# Patient Record
Sex: MALE | Race: WHITE
[De-identification: names, ages, dates, MRNs, and addresses within clinical notes are randomized per-mention and may not be internally consistent; named-entity substitution may affect disease eponyms.]

---

## 2020-06-08 ENCOUNTER — HOSPITAL ENCOUNTER (EMERGENCY)
Dept: HOSPITAL 95 - ER | Age: 30
Discharge: HOME | End: 2020-06-08
Payer: COMMERCIAL

## 2020-06-08 VITALS — BODY MASS INDEX: 38.36 KG/M2 | WEIGHT: 315 LBS | HEIGHT: 76 IN

## 2020-06-08 DIAGNOSIS — F10.239: Primary | ICD-10-CM

## 2020-06-08 DIAGNOSIS — F41.0: ICD-10-CM

## 2020-06-08 DIAGNOSIS — F17.210: ICD-10-CM

## 2020-06-08 LAB
ALBUMIN SERPL BCP-MCNC: 3.6 G/DL (ref 3.4–5)
ALBUMIN/GLOB SERPL: 0.9 {RATIO} (ref 0.8–1.8)
ALT SERPL W P-5'-P-CCNC: 61 U/L (ref 12–78)
ANION GAP SERPL CALCULATED.4IONS-SCNC: 4 MMOL/L (ref 6–16)
AST SERPL W P-5'-P-CCNC: 43 U/L (ref 12–37)
BASOPHILS # BLD AUTO: 0.02 K/MM3 (ref 0–0.23)
BASOPHILS NFR BLD AUTO: 0 % (ref 0–2)
BILIRUB SERPL-MCNC: 0.3 MG/DL (ref 0.1–1)
BUN SERPL-MCNC: 11 MG/DL (ref 8–24)
CALCIUM SERPL-MCNC: 8.6 MG/DL (ref 8.5–10.1)
CHLORIDE SERPL-SCNC: 110 MMOL/L (ref 98–108)
CO2 SERPL-SCNC: 28 MMOL/L (ref 21–32)
CREAT SERPL-MCNC: 0.84 MG/DL (ref 0.6–1.2)
DEPRECATED RDW RBC AUTO: 44.5 FL (ref 35.1–46.3)
EOSINOPHIL # BLD AUTO: 0.03 K/MM3 (ref 0–0.68)
EOSINOPHIL NFR BLD AUTO: 0 % (ref 0–6)
ERYTHROCYTE [DISTWIDTH] IN BLOOD BY AUTOMATED COUNT: 14 % (ref 11.7–14.2)
GLOBULIN SER CALC-MCNC: 3.8 G/DL (ref 2.2–4)
GLUCOSE SERPL-MCNC: 110 MG/DL (ref 70–99)
HCT VFR BLD AUTO: 43.6 % (ref 37–53)
HGB BLD-MCNC: 14.4 G/DL (ref 13.5–17.5)
IMM GRANULOCYTES # BLD AUTO: 0.05 K/MM3 (ref 0–0.1)
IMM GRANULOCYTES NFR BLD AUTO: 1 % (ref 0–1)
LYMPHOCYTES # BLD AUTO: 1.79 K/MM3 (ref 0.84–5.2)
LYMPHOCYTES NFR BLD AUTO: 21 % (ref 21–46)
MAGNESIUM SERPL-MCNC: 2.1 MG/DL (ref 1.6–2.4)
MCHC RBC AUTO-ENTMCNC: 33 G/DL (ref 31.5–36.5)
MCV RBC AUTO: 87 FL (ref 80–100)
MONOCYTES # BLD AUTO: 0.75 K/MM3 (ref 0.16–1.47)
MONOCYTES NFR BLD AUTO: 9 % (ref 4–13)
NEUTROPHILS # BLD AUTO: 5.8 K/MM3 (ref 1.96–9.15)
NEUTROPHILS NFR BLD AUTO: 69 % (ref 41–73)
NRBC # BLD AUTO: 0 K/MM3 (ref 0–0.02)
NRBC BLD AUTO-RTO: 0 /100 WBC (ref 0–0.2)
PLATELET # BLD AUTO: 299 K/MM3 (ref 150–400)
POTASSIUM SERPL-SCNC: 4.1 MMOL/L (ref 3.5–5.5)
PROT SERPL-MCNC: 7.4 G/DL (ref 6.4–8.2)
SODIUM SERPL-SCNC: 142 MMOL/L (ref 136–145)

## 2020-07-12 ENCOUNTER — HOSPITAL ENCOUNTER (OUTPATIENT)
Dept: HOSPITAL 95 - ER | Age: 30
Setting detail: OBSERVATION
LOS: 1 days | Discharge: HOME | End: 2020-07-13
Attending: HOSPITALIST | Admitting: EMERGENCY MEDICINE
Payer: COMMERCIAL

## 2020-07-12 VITALS — BODY MASS INDEX: 38.36 KG/M2 | HEIGHT: 75.98 IN | WEIGHT: 315 LBS
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
## 2020-07-12 DIAGNOSIS — F10.229: Primary | ICD-10-CM

## 2020-07-12 DIAGNOSIS — F17.210: ICD-10-CM

## 2020-07-12 DIAGNOSIS — G93.40: ICD-10-CM

## 2020-07-12 DIAGNOSIS — E66.9: ICD-10-CM

## 2020-07-12 DIAGNOSIS — I10: ICD-10-CM

## 2020-07-12 DIAGNOSIS — F41.9: ICD-10-CM

## 2020-07-12 DIAGNOSIS — K21.9: ICD-10-CM

## 2020-07-12 DIAGNOSIS — J96.01: ICD-10-CM

## 2020-07-12 DIAGNOSIS — Z79.899: ICD-10-CM

## 2020-07-12 LAB
ALBUMIN SERPL BCP-MCNC: 3.9 G/DL (ref 3.4–5)
ALBUMIN/GLOB SERPL: 1 {RATIO} (ref 0.8–1.8)
ALT SERPL W P-5'-P-CCNC: 65 U/L (ref 12–78)
ANION GAP SERPL CALCULATED.4IONS-SCNC: 7 MMOL/L (ref 6–16)
AST SERPL W P-5'-P-CCNC: 46 U/L (ref 12–37)
BASOPHILS # BLD AUTO: 0.03 K/MM3 (ref 0–0.23)
BASOPHILS NFR BLD AUTO: 0 % (ref 0–2)
BILIRUB SERPL-MCNC: 0.4 MG/DL (ref 0.1–1)
BUN SERPL-MCNC: 8 MG/DL (ref 8–24)
CALCIUM SERPL-MCNC: 8.1 MG/DL (ref 8.5–10.1)
CHLORIDE SERPL-SCNC: 108 MMOL/L (ref 98–108)
CO2 SERPL-SCNC: 24 MMOL/L (ref 21–32)
CREAT SERPL-MCNC: 1.1 MG/DL (ref 0.6–1.2)
DEPRECATED RDW RBC AUTO: 42.4 FL (ref 35.1–46.3)
EOSINOPHIL # BLD AUTO: 0.03 K/MM3 (ref 0–0.68)
EOSINOPHIL NFR BLD AUTO: 0 % (ref 0–6)
ERYTHROCYTE [DISTWIDTH] IN BLOOD BY AUTOMATED COUNT: 13 % (ref 11.7–14.2)
ETHANOL SERPL-MCNC: 340 MG/DL
GLOBULIN SER CALC-MCNC: 3.8 G/DL (ref 2.2–4)
GLUCOSE SERPL-MCNC: 103 MG/DL (ref 70–99)
HCT VFR BLD AUTO: 45.4 % (ref 37–53)
HGB BLD-MCNC: 14.6 G/DL (ref 13.5–17.5)
IMM GRANULOCYTES # BLD AUTO: 0.04 K/MM3 (ref 0–0.1)
IMM GRANULOCYTES NFR BLD AUTO: 0 % (ref 0–1)
LYMPHOCYTES # BLD AUTO: 3.78 K/MM3 (ref 0.84–5.2)
LYMPHOCYTES NFR BLD AUTO: 37 % (ref 21–46)
MCHC RBC AUTO-ENTMCNC: 32.2 G/DL (ref 31.5–36.5)
MCV RBC AUTO: 88 FL (ref 80–100)
MONOCYTES # BLD AUTO: 0.84 K/MM3 (ref 0.16–1.47)
MONOCYTES NFR BLD AUTO: 8 % (ref 4–13)
NEUTROPHILS # BLD AUTO: 5.51 K/MM3 (ref 1.96–9.15)
NEUTROPHILS NFR BLD AUTO: 54 % (ref 41–73)
NRBC # BLD AUTO: 0 K/MM3 (ref 0–0.02)
NRBC BLD AUTO-RTO: 0 /100 WBC (ref 0–0.2)
PLATELET # BLD AUTO: 387 K/MM3 (ref 150–400)
POTASSIUM SERPL-SCNC: 4 MMOL/L (ref 3.5–5.5)
PROT SERPL-MCNC: 7.7 G/DL (ref 6.4–8.2)
SODIUM SERPL-SCNC: 139 MMOL/L (ref 136–145)

## 2020-07-12 PROCEDURE — G0480 DRUG TEST DEF 1-7 CLASSES: HCPCS

## 2020-07-12 PROCEDURE — G0378 HOSPITAL OBSERVATION PER HR: HCPCS

## 2020-07-13 LAB
ALBUMIN SERPL BCP-MCNC: 3.5 G/DL (ref 3.4–5)
ALBUMIN/GLOB SERPL: 0.9 {RATIO} (ref 0.8–1.8)
ALT SERPL W P-5'-P-CCNC: 68 U/L (ref 12–78)
ANION GAP SERPL CALCULATED.4IONS-SCNC: 7 MMOL/L (ref 6–16)
AST SERPL W P-5'-P-CCNC: 117 U/L (ref 12–37)
BASOPHILS # BLD AUTO: 0.04 K/MM3 (ref 0–0.23)
BASOPHILS NFR BLD AUTO: 0 % (ref 0–2)
BILIRUB SERPL-MCNC: 0.6 MG/DL (ref 0.1–1)
BUN SERPL-MCNC: 10 MG/DL (ref 8–24)
CALCIUM SERPL-MCNC: 8 MG/DL (ref 8.5–10.1)
CHLORIDE SERPL-SCNC: 106 MMOL/L (ref 98–108)
CO2 SERPL-SCNC: 23 MMOL/L (ref 21–32)
CREAT SERPL-MCNC: 1.03 MG/DL (ref 0.6–1.2)
DEPRECATED RDW RBC AUTO: 44 FL (ref 35.1–46.3)
EOSINOPHIL # BLD AUTO: 0.01 K/MM3 (ref 0–0.68)
EOSINOPHIL NFR BLD AUTO: 0 % (ref 0–6)
ERYTHROCYTE [DISTWIDTH] IN BLOOD BY AUTOMATED COUNT: 13.2 % (ref 11.7–14.2)
GLOBULIN SER CALC-MCNC: 3.7 G/DL (ref 2.2–4)
GLUCOSE SERPL-MCNC: 112 MG/DL (ref 70–99)
HCT VFR BLD AUTO: 45.7 % (ref 37–53)
HGB BLD-MCNC: 14.3 G/DL (ref 13.5–17.5)
IMM GRANULOCYTES # BLD AUTO: 0.07 K/MM3 (ref 0–0.1)
IMM GRANULOCYTES NFR BLD AUTO: 1 % (ref 0–1)
LYMPHOCYTES # BLD AUTO: 1.87 K/MM3 (ref 0.84–5.2)
LYMPHOCYTES NFR BLD AUTO: 17 % (ref 21–46)
MCHC RBC AUTO-ENTMCNC: 31.3 G/DL (ref 31.5–36.5)
MCV RBC AUTO: 90 FL (ref 80–100)
MONOCYTES # BLD AUTO: 0.86 K/MM3 (ref 0.16–1.47)
MONOCYTES NFR BLD AUTO: 8 % (ref 4–13)
NEUTROPHILS # BLD AUTO: 8.37 K/MM3 (ref 1.96–9.15)
NEUTROPHILS NFR BLD AUTO: 75 % (ref 41–73)
NRBC # BLD AUTO: 0 K/MM3 (ref 0–0.02)
NRBC BLD AUTO-RTO: 0 /100 WBC (ref 0–0.2)
PLATELET # BLD AUTO: 308 K/MM3 (ref 150–400)
POTASSIUM SERPL-SCNC: 4.4 MMOL/L (ref 3.5–5.5)
PROT SERPL-MCNC: 7.2 G/DL (ref 6.4–8.2)
SODIUM SERPL-SCNC: 136 MMOL/L (ref 136–145)

## 2020-10-02 ENCOUNTER — HOSPITAL ENCOUNTER (EMERGENCY)
Dept: HOSPITAL 95 - ER | Age: 30
Discharge: HOME | End: 2020-10-02
Payer: COMMERCIAL

## 2020-10-02 VITALS — HEIGHT: 76 IN | WEIGHT: 315 LBS | BODY MASS INDEX: 38.36 KG/M2

## 2020-10-02 DIAGNOSIS — I10: ICD-10-CM

## 2020-10-02 DIAGNOSIS — S60.222A: Primary | ICD-10-CM
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
## 2020-10-02 DIAGNOSIS — F41.9: ICD-10-CM

## 2020-10-02 DIAGNOSIS — F17.210: ICD-10-CM

## 2020-10-02 DIAGNOSIS — Z79.899: ICD-10-CM

## 2020-10-03 ENCOUNTER — HOSPITAL ENCOUNTER (EMERGENCY)
Dept: HOSPITAL 95 - ER | Age: 30
Discharge: LEFT BEFORE DISCHARGE | End: 2020-10-03
Payer: COMMERCIAL

## 2020-10-03 VITALS — WEIGHT: 315 LBS | BODY MASS INDEX: 37.19 KG/M2 | HEIGHT: 77 IN

## 2020-10-03 DIAGNOSIS — Z53.21: Primary | ICD-10-CM

## 2020-10-04 ENCOUNTER — HOSPITAL ENCOUNTER (EMERGENCY)
Dept: HOSPITAL 95 - ER | Age: 30
Discharge: HOME | End: 2020-10-04
Payer: COMMERCIAL

## 2020-10-04 VITALS — WEIGHT: 315 LBS | HEIGHT: 77 IN | BODY MASS INDEX: 37.19 KG/M2

## 2020-10-04 DIAGNOSIS — F17.210: ICD-10-CM

## 2020-10-04 DIAGNOSIS — F32.9: ICD-10-CM

## 2020-10-04 DIAGNOSIS — Z60.9: ICD-10-CM

## 2020-10-04 DIAGNOSIS — R45.851: ICD-10-CM

## 2020-10-04 DIAGNOSIS — F10.10: Primary | ICD-10-CM

## 2020-10-04 DIAGNOSIS — Z79.899: ICD-10-CM

## 2020-10-04 DIAGNOSIS — F41.0: ICD-10-CM

## 2020-10-04 LAB
ALBUMIN SERPL BCP-MCNC: 3.9 G/DL (ref 3.4–5)
ALBUMIN/GLOB SERPL: 1 {RATIO} (ref 0.8–1.8)
ALT SERPL W P-5'-P-CCNC: 69 U/L (ref 12–78)
ANION GAP SERPL CALCULATED.4IONS-SCNC: 7 MMOL/L (ref 6–16)
AST SERPL W P-5'-P-CCNC: 51 U/L (ref 12–37)
BASOPHILS # BLD AUTO: 0.03 K/MM3 (ref 0–0.23)
BASOPHILS NFR BLD AUTO: 0 % (ref 0–2)
BILIRUB SERPL-MCNC: 0.6 MG/DL (ref 0.1–1)
BUN SERPL-MCNC: 11 MG/DL (ref 8–24)
CALCIUM SERPL-MCNC: 8.3 MG/DL (ref 8.5–10.1)
CHLORIDE SERPL-SCNC: 107 MMOL/L (ref 98–108)
CO2 SERPL-SCNC: 26 MMOL/L (ref 21–32)
CREAT SERPL-MCNC: 1.28 MG/DL (ref 0.6–1.2)
DEPRECATED RDW RBC AUTO: 44 FL (ref 35.1–46.3)
EOSINOPHIL # BLD AUTO: 0.03 K/MM3 (ref 0–0.68)
EOSINOPHIL NFR BLD AUTO: 0 % (ref 0–6)
ERYTHROCYTE [DISTWIDTH] IN BLOOD BY AUTOMATED COUNT: 13.8 % (ref 11.7–14.2)
GLOBULIN SER CALC-MCNC: 3.9 G/DL (ref 2.2–4)
GLUCOSE SERPL-MCNC: 111 MG/DL (ref 70–99)
HCT VFR BLD AUTO: 44.3 % (ref 37–53)
HGB BLD-MCNC: 14.6 G/DL (ref 13.5–17.5)
IMM GRANULOCYTES # BLD AUTO: 0.03 K/MM3 (ref 0–0.1)
IMM GRANULOCYTES NFR BLD AUTO: 0 % (ref 0–1)
LYMPHOCYTES # BLD AUTO: 2.55 K/MM3 (ref 0.84–5.2)
LYMPHOCYTES NFR BLD AUTO: 32 % (ref 21–46)
MCHC RBC AUTO-ENTMCNC: 33 G/DL (ref 31.5–36.5)
MCV RBC AUTO: 87 FL (ref 80–100)
MONOCYTES # BLD AUTO: 0.57 K/MM3 (ref 0.16–1.47)
MONOCYTES NFR BLD AUTO: 7 % (ref 4–13)
NEUTROPHILS # BLD AUTO: 4.79 K/MM3 (ref 1.96–9.15)
NEUTROPHILS NFR BLD AUTO: 60 % (ref 41–73)
NRBC # BLD AUTO: 0.02 K/MM3 (ref 0–0.02)
NRBC BLD AUTO-RTO: 0.3 /100 WBC (ref 0–0.2)
PLATELET # BLD AUTO: 340 K/MM3 (ref 150–400)
POTASSIUM SERPL-SCNC: 4.4 MMOL/L (ref 3.5–5.5)
PROT SERPL-MCNC: 7.8 G/DL (ref 6.4–8.2)
SODIUM SERPL-SCNC: 140 MMOL/L (ref 136–145)

## 2020-10-04 SDOH — SOCIAL STABILITY - SOCIAL INSECURITY: PROBLEM RELATED TO SOCIAL ENVIRONMENT, UNSPECIFIED: Z60.9

## 2021-03-19 ENCOUNTER — HOSPITAL ENCOUNTER (OUTPATIENT)
Dept: HOSPITAL 95 - ER | Age: 31
Setting detail: OBSERVATION
LOS: 1 days | Discharge: HOME | End: 2021-03-20
Attending: HOSPITALIST | Admitting: HOSPITALIST
Payer: COMMERCIAL

## 2021-03-19 VITALS — WEIGHT: 315 LBS | BODY MASS INDEX: 38.36 KG/M2 | HEIGHT: 75.98 IN

## 2021-03-19 DIAGNOSIS — F10.239: Primary | ICD-10-CM

## 2021-03-19 DIAGNOSIS — E66.01: ICD-10-CM

## 2021-03-19 DIAGNOSIS — Z89.019: ICD-10-CM
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
## 2021-03-19 DIAGNOSIS — F10.229: ICD-10-CM

## 2021-03-19 DIAGNOSIS — T51.0X2A: ICD-10-CM

## 2021-03-19 DIAGNOSIS — F17.210: ICD-10-CM

## 2021-03-19 DIAGNOSIS — I10: ICD-10-CM

## 2021-03-19 DIAGNOSIS — F41.9: ICD-10-CM

## 2021-03-19 LAB
ALBUMIN SERPL BCP-MCNC: 4 G/DL (ref 3.4–5)
ALBUMIN/GLOB SERPL: 1 {RATIO} (ref 0.8–1.8)
ALT SERPL W P-5'-P-CCNC: 59 U/L (ref 12–78)
ANION GAP SERPL CALCULATED.4IONS-SCNC: 5 MMOL/L (ref 6–16)
AST SERPL W P-5'-P-CCNC: 47 U/L (ref 12–37)
BASOPHILS # BLD AUTO: 0.04 K/MM3 (ref 0–0.23)
BASOPHILS NFR BLD AUTO: 0 % (ref 0–2)
BILIRUB SERPL-MCNC: 0.5 MG/DL (ref 0.1–1)
BUN SERPL-MCNC: 6 MG/DL (ref 8–24)
CALCIUM SERPL-MCNC: 8.6 MG/DL (ref 8.5–10.1)
CHLORIDE SERPL-SCNC: 106 MMOL/L (ref 98–108)
CO2 SERPL-SCNC: 30 MMOL/L (ref 21–32)
CREAT SERPL-MCNC: 0.91 MG/DL (ref 0.6–1.2)
DEPRECATED RDW RBC AUTO: 41.2 FL (ref 35.1–46.3)
EOSINOPHIL # BLD AUTO: 0.02 K/MM3 (ref 0–0.68)
EOSINOPHIL NFR BLD AUTO: 0 % (ref 0–6)
ERYTHROCYTE [DISTWIDTH] IN BLOOD BY AUTOMATED COUNT: 13.4 % (ref 11.7–14.2)
ETHANOL SERPL-MCNC: 307 MG/DL
GLOBULIN SER CALC-MCNC: 3.9 G/DL (ref 2.2–4)
GLUCOSE SERPL-MCNC: 119 MG/DL (ref 70–99)
HCT VFR BLD AUTO: 44.2 % (ref 37–53)
HGB BLD-MCNC: 14.9 G/DL (ref 13.5–17.5)
IMM GRANULOCYTES # BLD AUTO: 0.08 K/MM3 (ref 0–0.1)
IMM GRANULOCYTES NFR BLD AUTO: 1 % (ref 0–1)
LYMPHOCYTES # BLD AUTO: 3.22 K/MM3 (ref 0.84–5.2)
LYMPHOCYTES NFR BLD AUTO: 34 % (ref 21–46)
MCHC RBC AUTO-ENTMCNC: 33.7 G/DL (ref 31.5–36.5)
MCV RBC AUTO: 84 FL (ref 80–100)
MONOCYTES # BLD AUTO: 0.81 K/MM3 (ref 0.16–1.47)
MONOCYTES NFR BLD AUTO: 9 % (ref 4–13)
NEUTROPHILS # BLD AUTO: 5.24 K/MM3 (ref 1.96–9.15)
NEUTROPHILS NFR BLD AUTO: 56 % (ref 41–73)
NRBC # BLD AUTO: 0 K/MM3 (ref 0–0.02)
NRBC BLD AUTO-RTO: 0 /100 WBC (ref 0–0.2)
PLATELET # BLD AUTO: 386 K/MM3 (ref 150–400)
POTASSIUM SERPL-SCNC: 3.7 MMOL/L (ref 3.5–5.5)
PROT SERPL-MCNC: 7.9 G/DL (ref 6.4–8.2)
SODIUM SERPL-SCNC: 141 MMOL/L (ref 136–145)

## 2021-03-19 PROCEDURE — A9270 NON-COVERED ITEM OR SERVICE: HCPCS

## 2021-03-19 PROCEDURE — G0378 HOSPITAL OBSERVATION PER HR: HCPCS

## 2021-03-19 PROCEDURE — G0480 DRUG TEST DEF 1-7 CLASSES: HCPCS

## 2021-03-20 LAB
ALBUMIN SERPL BCP-MCNC: 3.5 G/DL (ref 3.4–5)
ALBUMIN/GLOB SERPL: 1 {RATIO} (ref 0.8–1.8)
ALT SERPL W P-5'-P-CCNC: 50 U/L (ref 12–78)
ANION GAP SERPL CALCULATED.4IONS-SCNC: 3 MMOL/L (ref 6–16)
AST SERPL W P-5'-P-CCNC: 35 U/L (ref 12–37)
BASOPHILS # BLD AUTO: 0.04 K/MM3 (ref 0–0.23)
BASOPHILS NFR BLD AUTO: 0 % (ref 0–2)
BILIRUB SERPL-MCNC: 0.5 MG/DL (ref 0.1–1)
BUN SERPL-MCNC: 7 MG/DL (ref 8–24)
CALCIUM SERPL-MCNC: 7.7 MG/DL (ref 8.5–10.1)
CHLORIDE SERPL-SCNC: 107 MMOL/L (ref 98–108)
CO2 SERPL-SCNC: 31 MMOL/L (ref 21–32)
CREAT SERPL-MCNC: 0.82 MG/DL (ref 0.6–1.2)
DEPRECATED RDW RBC AUTO: 43.8 FL (ref 35.1–46.3)
EOSINOPHIL # BLD AUTO: 0.03 K/MM3 (ref 0–0.68)
EOSINOPHIL NFR BLD AUTO: 0 % (ref 0–6)
ERYTHROCYTE [DISTWIDTH] IN BLOOD BY AUTOMATED COUNT: 13.8 % (ref 11.7–14.2)
GLOBULIN SER CALC-MCNC: 3.5 G/DL (ref 2.2–4)
GLUCOSE SERPL-MCNC: 135 MG/DL (ref 70–99)
HCT VFR BLD AUTO: 42.6 % (ref 37–53)
HGB BLD-MCNC: 13.9 G/DL (ref 13.5–17.5)
IMM GRANULOCYTES # BLD AUTO: 0.06 K/MM3 (ref 0–0.1)
IMM GRANULOCYTES NFR BLD AUTO: 1 % (ref 0–1)
LYMPHOCYTES # BLD AUTO: 3.31 K/MM3 (ref 0.84–5.2)
LYMPHOCYTES NFR BLD AUTO: 36 % (ref 21–46)
MCHC RBC AUTO-ENTMCNC: 32.6 G/DL (ref 31.5–36.5)
MCV RBC AUTO: 87 FL (ref 80–100)
MONOCYTES # BLD AUTO: 0.94 K/MM3 (ref 0.16–1.47)
MONOCYTES NFR BLD AUTO: 10 % (ref 4–13)
NEUTROPHILS # BLD AUTO: 4.85 K/MM3 (ref 1.96–9.15)
NEUTROPHILS NFR BLD AUTO: 53 % (ref 41–73)
NRBC # BLD AUTO: 0 K/MM3 (ref 0–0.02)
NRBC BLD AUTO-RTO: 0 /100 WBC (ref 0–0.2)
PLATELET # BLD AUTO: 403 K/MM3 (ref 150–400)
POTASSIUM SERPL-SCNC: 4.3 MMOL/L (ref 3.5–5.5)
PROT SERPL-MCNC: 7 G/DL (ref 6.4–8.2)
SODIUM SERPL-SCNC: 141 MMOL/L (ref 136–145)

## 2021-03-20 NOTE — NUR
Dr Webber by and assessed patient and discharged home. He is driving to rehab
in [place] and leaving today. Reduced anxiety and is looking forward to going
to sober living. He received written discharge instructions and recveiwed med
list and meds for ETOH withdrawl. Patient continues to be independent and
stable in room. Pulled IV intact and wrapped with coban. He was taken out in
wheelchair to [facility].

## 2021-03-20 NOTE — NUR
Received report from Tracey LU. Patient has been sleeping most of am on CPAP
10, 30% fiO2 and sats 98%. He just awakened and gave 2 mg Ativan and 50 mg
Librium as wekll all am meds and he tolerated well with sips of water. He
asked to stand at bedside and did well independently ansd remains a little
shakey. He denies any apetite and refuse breakfast currently. He is on banana
bag at 75 ml/hr infusing in 18ga IV in LAC. He uses urinal appropriately. He
is SR 70's and systolic 140-150's prior to am meds. He is currently on RA and
sats low 90%'s.

## 2021-03-20 NOTE — NUR
PT TO ICU 14 VIA [facility] WITH ED RN @ 0125, PT ALERT AND ORIENTED TO SELF,
EVENT, LOCATION, MONTH/YEAR AND FOLLOWING DIRECTIONS. PT TRANSFERED TO ICU
WITH SBA. CAESARWA 15, MEDICATED WITH ATIVAN AND LIBRIUM. MONITOR SHOWS SINUS
RHYTHM WITH HR 80'S-90'S, BP STABLE. PT ON 3L PER NC. PTS SPOUSE TO ROOM,
COMPLETED MED REC AND ADMIT Hx WITH PTS SPOUSE, DISCUSSED PLAN OF CARE WITH
SPOUSE, SPOUSE AND PT PLEASANT AND AGREEABLE WITH PLAN TO MEDICATE PT
PRIMARILY WITH LIBRIUM AND ATIVAN FOR ALCOHOL WITHDRAWALS. PTS WIFE STS PT WAS
RECENTLY DISCHARGED FROM REHAB FOR ALCOHOL ABUSE IN FEBRUARY AND THE PLAN IS
FOR PT TO TRAVEL TO [place] IN A COUPLE OF DAYS FOR A 9 MONTH INPATIENT REHAB.
PT QUICKLY FELL ASLEEP AFTER ATIVAN ADMINISTRATION. SPOUSE STS PT RECENTLY HAD
SLEEP STUDY BUT THEY HAVE NOT RECEIVED RESULTS YET. PT CURRENTLY WITH
WITNESSED PERIODS OF APNEA WHILE SLEEPING WITH OXYGEN DESATURATIONS TO 60'S.
CALL PLACED TO DR YBARRA, SEE NEW ORDER FOR CPAP/BIPAP. NOTIFED RT OF NEW
ORDER. PT DENIED GI/ ISSUES. URINAL AT BEDSIDE, CALL LIGHT WITHIN REACH.

## 2021-03-20 NOTE — NUR
patient sitting up in bwed and is appropriate and cooperative, His tremors are
decreased and is independent in room. He states no neEd currently for
intervention for anxiety. VSS, See EMR. Nicotine patch placed. CIWA 5

## 2021-03-20 NOTE — NUR
PT PLACED ON CPAP 10, FIO2 30%. WOB DECREASED, AIRMOVEMENT AND LUNG SOUNDS
IMPROVED, MILD COARSNESS NOTED T/O. PT REMAINS VERY SEDATED AND DIFFICULT TO
AROUSE.

## 2021-03-20 NOTE — NUR
After patient done with shower requested breakfast and ate about 70% and
started to go back top sleep. I placed back on CPAP at 1030 same settings and
sats >95%. Wife called and gave update. Dr Hightower by and med changes for home
meds.

## 2021-03-20 NOTE — NUR
SHIFT SUMMARY
 
PT REMAINS ON CPAP 10 FIO2 40%, PT CONTINUES TO BE DIFFICULT TO AROUSE BUT
OPENS EYES TO VERBAL STIMULI. MONITOR SHOWS SINUS RHYTHM WITH HR 80'S-90'S, BP
STABLE. CIWA UPON ARRIVAL TO UNIT 15, PT SLEEPING/DIFFICULT TO AROUSE FOR ALL
FOLLOW UP ASSESSMENTS. O2 SATURATIONS> 90% ON CPAP, RESPIRATIONS EVEN AND
UNLABORED. NO VOID/BM THIS SHIFT. CALL LIGHT WITHIN REACH.

## 2021-11-18 NOTE — NUR
PTS O2 SATURATIONS MAINTAINING 70'S-80'S, PT DIFFICULT TO AROUSE, PT PLACED ON
15L NRB, O2 SATURATIONS IMPROVED TO 95-99%
 
SECOND CALL PLACED TO RT FOR CPAP. Detail Level: Simple Plan: Consider adding diflucan if not improved with above Initiate Treatment: betamethasone dipropionate 0.05 % topical ointment Apply to affected area of finger twice daily x up to 2 weeks. Occlude with glove\\n\\nSoak for finger in water x 10 min. Otc Regimen: Liquid bandage around cuticle

## 2022-01-05 ENCOUNTER — HOSPITAL ENCOUNTER (EMERGENCY)
Dept: HOSPITAL 95 - ER | Age: 32
Discharge: HOME | End: 2022-01-05
Payer: COMMERCIAL

## 2022-01-05 VITALS — WEIGHT: 315 LBS | HEIGHT: 77 IN | BODY MASS INDEX: 37.19 KG/M2

## 2022-01-05 DIAGNOSIS — Z79.899: ICD-10-CM
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
## 2022-01-05 DIAGNOSIS — F10.239: Primary | ICD-10-CM

## 2022-01-05 DIAGNOSIS — F17.210: ICD-10-CM

## 2022-01-05 DIAGNOSIS — F10.229: ICD-10-CM

## 2022-01-05 DIAGNOSIS — I10: ICD-10-CM

## 2022-01-05 LAB
ALBUMIN SERPL BCP-MCNC: 3.6 G/DL (ref 3.4–5)
ALBUMIN/GLOB SERPL: 1 {RATIO} (ref 0.8–1.8)
ALT SERPL W P-5'-P-CCNC: 102 U/L (ref 12–78)
ANION GAP SERPL CALCULATED.4IONS-SCNC: 6 MMOL/L (ref 6–16)
AST SERPL W P-5'-P-CCNC: 66 U/L (ref 12–37)
BASOPHILS # BLD AUTO: 0.03 K/MM3 (ref 0–0.23)
BASOPHILS NFR BLD AUTO: 1 % (ref 0–2)
BILIRUB SERPL-MCNC: 0.8 MG/DL (ref 0.1–1)
BUN SERPL-MCNC: 7 MG/DL (ref 8–24)
CALCIUM SERPL-MCNC: 8.3 MG/DL (ref 8.5–10.1)
CHLORIDE SERPL-SCNC: 107 MMOL/L (ref 98–108)
CO2 SERPL-SCNC: 27 MMOL/L (ref 21–32)
CREAT SERPL-MCNC: 0.92 MG/DL (ref 0.6–1.2)
DEPRECATED RDW RBC AUTO: 41.7 FL (ref 35.1–46.3)
EOSINOPHIL # BLD AUTO: 0.02 K/MM3 (ref 0–0.68)
EOSINOPHIL NFR BLD AUTO: 0 % (ref 0–6)
ERYTHROCYTE [DISTWIDTH] IN BLOOD BY AUTOMATED COUNT: 13.6 % (ref 11.7–14.2)
ETHANOL SERPL-MCNC: 71 MG/DL
GLOBULIN SER CALC-MCNC: 3.6 G/DL (ref 2.2–4)
GLUCOSE SERPL-MCNC: 119 MG/DL (ref 70–99)
HCT VFR BLD AUTO: 45.6 % (ref 37–53)
HGB BLD-MCNC: 15.2 G/DL (ref 13.5–17.5)
IMM GRANULOCYTES # BLD AUTO: 0.03 K/MM3 (ref 0–0.1)
IMM GRANULOCYTES NFR BLD AUTO: 1 % (ref 0–1)
LYMPHOCYTES # BLD AUTO: 1.64 K/MM3 (ref 0.84–5.2)
LYMPHOCYTES NFR BLD AUTO: 25 % (ref 21–46)
MCHC RBC AUTO-ENTMCNC: 33.3 G/DL (ref 31.5–36.5)
MCV RBC AUTO: 83 FL (ref 80–100)
MONOCYTES # BLD AUTO: 0.55 K/MM3 (ref 0.16–1.47)
MONOCYTES NFR BLD AUTO: 8 % (ref 4–13)
NEUTROPHILS # BLD AUTO: 4.34 K/MM3 (ref 1.96–9.15)
NEUTROPHILS NFR BLD AUTO: 66 % (ref 41–73)
NRBC # BLD AUTO: 0 K/MM3 (ref 0–0.02)
NRBC BLD AUTO-RTO: 0 /100 WBC (ref 0–0.2)
PLATELET # BLD AUTO: 369 K/MM3 (ref 150–400)
POTASSIUM SERPL-SCNC: 4 MMOL/L (ref 3.5–5.5)
PROT SERPL-MCNC: 7.2 G/DL (ref 6.4–8.2)
SODIUM SERPL-SCNC: 140 MMOL/L (ref 136–145)
TROPONIN I SERPL-MCNC: <0.015 NG/ML (ref 0–0.04)

## 2022-01-05 PROCEDURE — G0480 DRUG TEST DEF 1-7 CLASSES: HCPCS

## 2022-03-21 ENCOUNTER — HOSPITAL ENCOUNTER (INPATIENT)
Dept: HOSPITAL 95 - ER | Age: 32
LOS: 3 days | Discharge: HOME | DRG: 897 | End: 2022-03-24
Attending: HOSPITALIST | Admitting: HOSPITALIST
Payer: COMMERCIAL

## 2022-03-21 VITALS — HEIGHT: 76 IN | WEIGHT: 249.78 LBS | BODY MASS INDEX: 30.42 KG/M2

## 2022-03-21 DIAGNOSIS — E66.01: ICD-10-CM

## 2022-03-21 DIAGNOSIS — F41.0: ICD-10-CM

## 2022-03-21 DIAGNOSIS — F41.9: ICD-10-CM

## 2022-03-21 DIAGNOSIS — Z89.019: ICD-10-CM

## 2022-03-21 DIAGNOSIS — D10.2: ICD-10-CM

## 2022-03-21 DIAGNOSIS — F17.210: ICD-10-CM

## 2022-03-21 DIAGNOSIS — F10.231: Primary | ICD-10-CM

## 2022-03-21 DIAGNOSIS — Z79.899: ICD-10-CM

## 2022-03-21 LAB
ALBUMIN SERPL BCP-MCNC: 3.9 G/DL (ref 3.4–5)
ALBUMIN/GLOB SERPL: 0.9 {RATIO} (ref 0.8–1.8)
ALT SERPL W P-5'-P-CCNC: 95 U/L (ref 12–78)
ANION GAP SERPL CALCULATED.4IONS-SCNC: 6 MMOL/L (ref 6–16)
AST SERPL W P-5'-P-CCNC: 63 U/L (ref 12–37)
BASOPHILS # BLD AUTO: 0.04 K/MM3 (ref 0–0.23)
BASOPHILS NFR BLD AUTO: 0 % (ref 0–2)
BILIRUB SERPL-MCNC: 0.5 MG/DL (ref 0.1–1)
BUN SERPL-MCNC: 11 MG/DL (ref 8–24)
CALCIUM SERPL-MCNC: 8.9 MG/DL (ref 8.5–10.1)
CHLORIDE SERPL-SCNC: 108 MMOL/L (ref 98–108)
CO2 SERPL-SCNC: 24 MMOL/L (ref 21–32)
CREAT SERPL-MCNC: 1.16 MG/DL (ref 0.6–1.2)
DEPRECATED RDW RBC AUTO: 39.8 FL (ref 35.1–46.3)
EOSINOPHIL # BLD AUTO: 0.01 K/MM3 (ref 0–0.68)
EOSINOPHIL NFR BLD AUTO: 0 % (ref 0–6)
ERYTHROCYTE [DISTWIDTH] IN BLOOD BY AUTOMATED COUNT: 13.3 % (ref 11.7–14.2)
ETHANOL SERPL-MCNC: 314 MG/DL
GLOBULIN SER CALC-MCNC: 4.4 G/DL (ref 2.2–4)
GLUCOSE SERPL-MCNC: 148 MG/DL (ref 70–99)
HCT VFR BLD AUTO: 47.7 % (ref 37–53)
HGB BLD-MCNC: 16.3 G/DL (ref 13.5–17.5)
IMM GRANULOCYTES # BLD AUTO: 0.05 K/MM3 (ref 0–0.1)
IMM GRANULOCYTES NFR BLD AUTO: 0 % (ref 0–1)
LYMPHOCYTES # BLD AUTO: 3.92 K/MM3 (ref 0.84–5.2)
LYMPHOCYTES NFR BLD AUTO: 35 % (ref 21–46)
MAGNESIUM SERPL-MCNC: 2.2 MG/DL (ref 1.6–2.4)
MCHC RBC AUTO-ENTMCNC: 34.2 G/DL (ref 31.5–36.5)
MCV RBC AUTO: 82 FL (ref 80–100)
MONOCYTES # BLD AUTO: 0.87 K/MM3 (ref 0.16–1.47)
MONOCYTES NFR BLD AUTO: 8 % (ref 4–13)
NEUTROPHILS # BLD AUTO: 6.46 K/MM3 (ref 1.96–9.15)
NEUTROPHILS NFR BLD AUTO: 57 % (ref 41–73)
NRBC # BLD AUTO: 0 K/MM3 (ref 0–0.02)
NRBC BLD AUTO-RTO: 0 /100 WBC (ref 0–0.2)
PLATELET # BLD AUTO: 470 K/MM3 (ref 150–400)
POTASSIUM SERPL-SCNC: 4.2 MMOL/L (ref 3.5–5.5)
PROT SERPL-MCNC: 8.3 G/DL (ref 6.4–8.2)
PROT UR STRIP-MCNC: (no result) MG/DL
SODIUM SERPL-SCNC: 138 MMOL/L (ref 136–145)
SP GR SPEC: 1.02 (ref 1–1.02)
UROBILINOGEN UR STRIP-MCNC: (no result) MG/DL

## 2022-03-21 PROCEDURE — A9270 NON-COVERED ITEM OR SERVICE: HCPCS

## 2022-03-21 PROCEDURE — G0480 DRUG TEST DEF 1-7 CLASSES: HCPCS

## 2022-03-22 LAB
ALBUMIN SERPL BCP-MCNC: 3.4 G/DL (ref 3.4–5)
ALBUMIN/GLOB SERPL: 0.9 {RATIO} (ref 0.8–1.8)
ALT SERPL W P-5'-P-CCNC: 81 U/L (ref 12–78)
ANION GAP SERPL CALCULATED.4IONS-SCNC: 11 MMOL/L (ref 6–16)
AST SERPL W P-5'-P-CCNC: 65 U/L (ref 12–37)
BASOPHILS # BLD AUTO: 0.03 K/MM3 (ref 0–0.23)
BASOPHILS NFR BLD AUTO: 0 % (ref 0–2)
BILIRUB SERPL-MCNC: 0.2 MG/DL (ref 0.1–1)
BUN SERPL-MCNC: 11 MG/DL (ref 8–24)
CALCIUM SERPL-MCNC: 8.5 MG/DL (ref 8.5–10.1)
CHLORIDE SERPL-SCNC: 103 MMOL/L (ref 98–108)
CO2 SERPL-SCNC: 25 MMOL/L (ref 21–32)
CREAT SERPL-MCNC: 0.86 MG/DL (ref 0.6–1.2)
DEPRECATED RDW RBC AUTO: 42.1 FL (ref 35.1–46.3)
EOSINOPHIL # BLD AUTO: 0.02 K/MM3 (ref 0–0.68)
EOSINOPHIL NFR BLD AUTO: 0 % (ref 0–6)
ERYTHROCYTE [DISTWIDTH] IN BLOOD BY AUTOMATED COUNT: 13.4 % (ref 11.7–14.2)
GLOBULIN SER CALC-MCNC: 3.6 G/DL (ref 2.2–4)
GLUCOSE SERPL-MCNC: 129 MG/DL (ref 70–99)
HCT VFR BLD AUTO: 42 % (ref 37–53)
HGB BLD-MCNC: 13.7 G/DL (ref 13.5–17.5)
IMM GRANULOCYTES # BLD AUTO: 0.03 K/MM3 (ref 0–0.1)
IMM GRANULOCYTES NFR BLD AUTO: 0 % (ref 0–1)
LYMPHOCYTES # BLD AUTO: 3.22 K/MM3 (ref 0.84–5.2)
LYMPHOCYTES NFR BLD AUTO: 34 % (ref 21–46)
MCHC RBC AUTO-ENTMCNC: 32.6 G/DL (ref 31.5–36.5)
MCV RBC AUTO: 85 FL (ref 80–100)
MONOCYTES # BLD AUTO: 0.82 K/MM3 (ref 0.16–1.47)
MONOCYTES NFR BLD AUTO: 9 % (ref 4–13)
NEUTROPHILS # BLD AUTO: 5.42 K/MM3 (ref 1.96–9.15)
NEUTROPHILS NFR BLD AUTO: 57 % (ref 41–73)
NRBC # BLD AUTO: 0 K/MM3 (ref 0–0.02)
NRBC BLD AUTO-RTO: 0 /100 WBC (ref 0–0.2)
PLATELET # BLD AUTO: 356 K/MM3 (ref 150–400)
POTASSIUM SERPL-SCNC: 4.3 MMOL/L (ref 3.5–5.5)
PROT SERPL-MCNC: 7 G/DL (ref 6.4–8.2)
RBC #/AREA URNS HPF: (no result) /HPF (ref 0–2)
SODIUM SERPL-SCNC: 139 MMOL/L (ref 136–145)
WBC #/AREA URNS HPF: (no result) /HPF (ref 0–5)

## 2022-03-22 NOTE — NUR
SHIFT SUMMARY
 
PATIENT ARRIVED TO ICU4 FROM ER WITH BANANA BAG INF TO 20G RT HAND IV; 18G LT
FA IV PATENT. PATIENT WAS ABLE TO SELF TRANSFER FROM ER GURNEY TO ICU BED.
CIWA'S RANGED FROM 3-22 IN ICU-TOTAL OF 3MG ATIVAN IV AND LIBRIUM 50MG PO
GIVEN. PATIENT SLEPT MOST OF SHIFT FOLLOWING ADMISSION. 5LPM NC PLACED ON
PATIENT D/T SPO2 DESATTING WHEN ASLEEP. VS REMAINED STABLE AFTER THAT. PATIENT
WAS CALM AND COOPERATIVE WITH CARE. ABLE TO MAKE NEEDS KNOWN TO STAFF. NEEDS
TO BE REMINDED TO REPOSITION SELF. NO OTHER MAJOR CHANGES DURING SHIFT.

## 2022-03-22 NOTE — NUR
SHIFT SUMMARY
NO ACUTE CHANGES THIS SHIFT. PT IS ALERT, ORIENTED, AND COOPERATIVE WHEN
AWAKE. PT ANSWERS QUESTIONS APPROPRIATELY AND FOLLOWS DIRECTIONS WELL. PT WITH
INCREASING ANXEITY AND TREMORS THIS AFTERNOON. PT MED WITH LIBRIUM AND ATIVAN
PER EMAR. PT UP TO TOILET TO VOID AND HAVE BM'S WITH STAND BY ASSIST. PT
TOLERATING PO INTAKE WELL. BANANA BAG INFUSING  ML/HR. PT MOTHER BY TO
SEE PT MULTIPLE TIMES THIS SHIFT. PT DISCUSSED LONG TERM ALCOHOL TREATMENT
OPTIONS WITH  THIS AM. PT STATES HE WOULD LIKE TO GO STRAIGHT FROM
THE HOSPITAL TO REHAB WHEN ABLE. VITAL SIGNS STABLE. PT WITH SNORING
RESPIRATIONS WHILE SLEEPING, REQUIRING 4L O2 NC. WILL CONTINUE TO MONITOR AND
REPORT OFF TO ONCOMING RN.

## 2022-03-22 NOTE — NUR
ASSUMED CARE
ASSUMED CARE OF PATIENT AT 1900.  AWAKE AND ALERT.  SITTING UP ON EDGE OF BED.
ORIENTED AND COOPERATIVE. PT STATES HE IS FEELING ANXIOUS.  CIWA 10.  TREMORS
AND SWEATING NOTED.  DENIES C/O NAUSEA OR HEADACHE AT THIS TIME.  RESTLESS
BEHAVIOR NOTED.  MONITOR SHOWS NSR, RATE 70s.  BP STABLE.  REMAINS ON 4LNC AT
THIS TIME D/T DESATURATIONS WHEN ASLEEP.  TOLERATING DIET WELL.  UP TO TOILET
TO VOID WITH STAND-BY ASSIST.  VOIDING WITHOUT DIFFICULTY.  BANANA BAG
INFUSING PER ORDER.  SEE SHIFT ASSESSMENT FOR FULL ASSESSMENT.

## 2022-03-23 LAB
ANION GAP SERPL CALCULATED.4IONS-SCNC: 7 MMOL/L (ref 6–16)
BASOPHILS # BLD AUTO: 0.02 K/MM3 (ref 0–0.23)
BASOPHILS NFR BLD AUTO: 0 % (ref 0–2)
BUN SERPL-MCNC: 15 MG/DL (ref 8–24)
CALCIUM SERPL-MCNC: 8.4 MG/DL (ref 8.5–10.1)
CHLORIDE SERPL-SCNC: 101 MMOL/L (ref 98–108)
CO2 SERPL-SCNC: 29 MMOL/L (ref 21–32)
CREAT SERPL-MCNC: 0.93 MG/DL (ref 0.6–1.2)
DEPRECATED RDW RBC AUTO: 42.3 FL (ref 35.1–46.3)
EOSINOPHIL # BLD AUTO: 0.04 K/MM3 (ref 0–0.68)
EOSINOPHIL NFR BLD AUTO: 1 % (ref 0–6)
ERYTHROCYTE [DISTWIDTH] IN BLOOD BY AUTOMATED COUNT: 13.2 % (ref 11.7–14.2)
GLUCOSE SERPL-MCNC: 91 MG/DL (ref 70–99)
HCT VFR BLD AUTO: 43 % (ref 37–53)
HGB BLD-MCNC: 14 G/DL (ref 13.5–17.5)
IMM GRANULOCYTES # BLD AUTO: 0.02 K/MM3 (ref 0–0.1)
IMM GRANULOCYTES NFR BLD AUTO: 0 % (ref 0–1)
LYMPHOCYTES # BLD AUTO: 2.13 K/MM3 (ref 0.84–5.2)
LYMPHOCYTES NFR BLD AUTO: 30 % (ref 21–46)
MCHC RBC AUTO-ENTMCNC: 32.6 G/DL (ref 31.5–36.5)
MCV RBC AUTO: 87 FL (ref 80–100)
MONOCYTES # BLD AUTO: 0.55 K/MM3 (ref 0.16–1.47)
MONOCYTES NFR BLD AUTO: 8 % (ref 4–13)
NEUTROPHILS # BLD AUTO: 4.32 K/MM3 (ref 1.96–9.15)
NEUTROPHILS NFR BLD AUTO: 61 % (ref 41–73)
NRBC # BLD AUTO: 0 K/MM3 (ref 0–0.02)
NRBC BLD AUTO-RTO: 0 /100 WBC (ref 0–0.2)
PLATELET # BLD AUTO: 269 K/MM3 (ref 150–400)
POTASSIUM SERPL-SCNC: 4.7 MMOL/L (ref 3.5–5.5)
SODIUM SERPL-SCNC: 137 MMOL/L (ref 136–145)

## 2022-03-23 NOTE — NUR
Pt. is sitting up in a chair. Pt. welcomes my visit. Pt. accurately verbalizes
his condition. Through theraputic listening am able to develop rapport.
Encourage self-care. Pt.  verbalizes some issues of catherine and belief.  Explore
personal issues of catherine and belief. Reinforced helpful attitudes and
continued steps in pts. recovery plan. Prayed with pt. Pt. verbalizes
gratitude for prayer and spiritual care visit.

## 2022-03-23 NOTE — NUR
UNABLE TO AROUSE PT AT BEGINNING OF SHIFT. PRECEDEX GTT AT 1.0MCG/KG/HR FOR
AGGITATION/SEDATION. TITRATED DOWN TO 0.5MCG/KG/HR, PT IS ALERT AND ORIENTED
X4, ANXIOUS, BUT COOPERATIVE. CIWA OF 8 DONE. PT STATES HE WANTS TO KEEP HIS
ANXIETY DECREASED, OTHERWISE HE WILL WANT TO LEAVE. CALL TO DR. BAIG TO OBTAIN
ORDERS FOR LIBRIUM 50MG PO Q6H PRN. ATIVAN IS AVAILABLE PRN. HR IS NSR, RATE
IN 60'S. BP STABLE. WEARING 5L O2 NC FOR SLEEP APNEA/SNORING, HAS CPAP AT
HOME, BUT DOES NOT WEAR. HE ATE HALF OF HIS DINNER, AND REQUESTED TO GO BACK
TO SLEEP. ORDERS REVIEWED, WILL TREAT AS PRESCRIBED.

## 2022-03-23 NOTE — NUR
REPORTED TO DR DAE SAWANT HEART RATE INCREASED, BP INCREASED, STARTED
PRECEDEX GTT, MEDICATED WITH ATIVAN, PATIETN WANTING TO LEAVE AMA, NEW ORDER
FOR A ONE TIME ATIVAN ADD, WCTM

## 2022-03-23 NOTE — NUR
SHIFT SUMMARY
NO ACUTE CHANGES DURING NOC.  SLEPT WHEN UNDISTURBED. ROUSES EASILY TO VERBAL
STIMULI.  CIWA SCORES 5-10 DURING NOC.  MEDICATED WITH ATIVAN 2MG IV X 1 DOSE
AND LIBRIUM 50MG PO X 2 DOSES.  VSS, ALTHOUGH PT IS OCCASIONALLY HYPERTENSIVE.
UP TO BR WITH STAND-BY ASSIST TO VOID.  BED ALARM IS ON WHILE IN BED.  DENIES
C/O PAIN.  INTERMITTENT MILD NAUSEA.  O2 3L NC WHILE SLEEPING D/T PERIODS OF
APNEA.  WILL REPORT TO ONCOMING RN WHEN AVAILABLE.

## 2022-03-23 NOTE — NUR
PATEINT ALERT AND ORIENTED, AGGITATION, IRRITABILITY, THREATENING TO LEAVE AMA
TO GET DRUNK AROUND 1500 TODAY, REPORTED CHANGE TO DR DAE, STARTED PRECEDEX
GTT NOW AT 0.3 MCG, HEART RATE 70-78S, NO NICK CARDIA, SNORING LOUDLY, 5L O2
VIA NV, ORDER FOR PRN CPAP AND OXYGEN ENTERED. BED ALARM ON. LS DIMINISHED
THROUGH OUT, STAT 95% WHILE SLEEPING ON 5L O2, NO DISTRESS, RESPS EVEN AND
NONLABORED. DISTANT HEART TONES, SNR-SINUS TACH. PATIENT ATE BREAKFAST AND
LUNCH, DENIED N/V, VOIDING PER TOILET, INDEPEDANT FOR ADLS, STEADY GAIT IN
ROOM, WALKED TO SHOWER AND BACK TODAY.  WORKING ON PLACEMENT AT A
DR[facility] RE[facility] IN [place]. WILL RELAY TO PM RN, ESTEVAN

## 2022-03-24 ENCOUNTER — HOSPITAL ENCOUNTER (EMERGENCY)
Dept: HOSPITAL 95 - ER | Age: 32
LOS: 1 days | Discharge: LEFT BEFORE DISCHARGE | End: 2022-03-25
Payer: COMMERCIAL

## 2022-03-24 ENCOUNTER — HOSPITAL ENCOUNTER (EMERGENCY)
Dept: HOSPITAL 95 - ER | Age: 32
Discharge: LEFT BEFORE DISCHARGE | End: 2022-03-24
Payer: COMMERCIAL

## 2022-03-24 VITALS — HEIGHT: 76 IN | WEIGHT: 315 LBS | BODY MASS INDEX: 38.36 KG/M2

## 2022-03-24 DIAGNOSIS — Z53.21: ICD-10-CM

## 2022-03-24 DIAGNOSIS — F10.239: Primary | ICD-10-CM

## 2022-03-24 DIAGNOSIS — Z53.21: Primary | ICD-10-CM

## 2022-03-24 LAB
ALBUMIN SERPL BCP-MCNC: 3.6 G/DL (ref 3.4–5)
ALBUMIN/GLOB SERPL: 0.9 {RATIO} (ref 0.8–1.8)
ALT SERPL W P-5'-P-CCNC: 74 U/L (ref 12–78)
ANION GAP SERPL CALCULATED.4IONS-SCNC: 2 MMOL/L (ref 6–16)
AST SERPL W P-5'-P-CCNC: 43 U/L (ref 12–37)
BASOPHILS # BLD AUTO: 0.04 K/MM3 (ref 0–0.23)
BASOPHILS NFR BLD AUTO: 0 % (ref 0–2)
BILIRUB SERPL-MCNC: 0.5 MG/DL (ref 0.1–1)
BUN SERPL-MCNC: 24 MG/DL (ref 8–24)
CALCIUM SERPL-MCNC: 8.9 MG/DL (ref 8.5–10.1)
CHLORIDE SERPL-SCNC: 105 MMOL/L (ref 98–108)
CO2 SERPL-SCNC: 28 MMOL/L (ref 21–32)
CREAT SERPL-MCNC: 1.11 MG/DL (ref 0.6–1.2)
DEPRECATED RDW RBC AUTO: 37.9 FL (ref 35.1–46.3)
EOSINOPHIL # BLD AUTO: 0.05 K/MM3 (ref 0–0.68)
EOSINOPHIL NFR BLD AUTO: 1 % (ref 0–6)
ERYTHROCYTE [DISTWIDTH] IN BLOOD BY AUTOMATED COUNT: 12.5 % (ref 11.7–14.2)
ETHANOL SERPL-MCNC: <3 MG/DL
GLOBULIN SER CALC-MCNC: 3.9 G/DL (ref 2.2–4)
GLUCOSE SERPL-MCNC: 111 MG/DL (ref 70–99)
HCT VFR BLD AUTO: 42.7 % (ref 37–53)
HGB BLD-MCNC: 14.6 G/DL (ref 13.5–17.5)
IMM GRANULOCYTES # BLD AUTO: 0.03 K/MM3 (ref 0–0.1)
IMM GRANULOCYTES NFR BLD AUTO: 0 % (ref 0–1)
LYMPHOCYTES # BLD AUTO: 1.49 K/MM3 (ref 0.84–5.2)
LYMPHOCYTES NFR BLD AUTO: 14 % (ref 21–46)
MAGNESIUM SERPL-MCNC: 2.1 MG/DL (ref 1.6–2.4)
MCHC RBC AUTO-ENTMCNC: 34.2 G/DL (ref 31.5–36.5)
MCV RBC AUTO: 83 FL (ref 80–100)
MONOCYTES # BLD AUTO: 0.53 K/MM3 (ref 0.16–1.47)
MONOCYTES NFR BLD AUTO: 5 % (ref 4–13)
NEUTROPHILS # BLD AUTO: 8.28 K/MM3 (ref 1.96–9.15)
NEUTROPHILS NFR BLD AUTO: 79 % (ref 41–73)
NRBC # BLD AUTO: 0 K/MM3 (ref 0–0.02)
NRBC BLD AUTO-RTO: 0 /100 WBC (ref 0–0.2)
PLATELET # BLD AUTO: 286 K/MM3 (ref 150–400)
POTASSIUM SERPL-SCNC: 4.1 MMOL/L (ref 3.5–5.5)
PROT SERPL-MCNC: 7.5 G/DL (ref 6.4–8.2)
SODIUM SERPL-SCNC: 135 MMOL/L (ref 136–145)

## 2022-03-24 PROCEDURE — G0480 DRUG TEST DEF 1-7 CLASSES: HCPCS

## 2022-03-24 NOTE — NUR
PATIENT AFEBRILE.  NO COMPLAINTS OF PAIN.  PATIENT BECOMING MORE AGITATION.
PATIENT ADMITS TO FEELING AGITATED AND STATES THAT HE WANTS TO LEAVE.  NURSE
HAD LONG TALK WITH PATIENT AND WAS ABLE TO DIFFUSE SITUATION SOME.  CIWA SCORE
OF 9.  PRN ATIVAN GIVEN.  ZOLOFT STARTED.  PRECEDEX AT 0.3 MCG/ KG/ HOUR.  HR
IN THE 60S.  SBP IN THE 130S.  PATIENT SATTING 90% AND GREATER ON RA. NO OTHER
ACUTE CHANGES TO NOTE ON AT THIS TIME.  WILL CONTINUE TO MONITOR.

## 2022-03-24 NOTE — NUR
MEDICATED WITH PRN LIBRIUM AND ATIVAN PER EMAR AND CIWA. MOST RECENT CIWA OF
8. PT REMAINS ALERT AND ORIENTED X4. PRECEDEX AT 0.5MCG/KG/HR. PT STATES HE
SLEPT WELL FOR MOST OF SHIFT AND ANXIETY HAS IMPROVED. HE IS SLIGHTLY
TREMULOUS WHEN REPOSITIONING SELF, BUT HAD STEADY GAIT WHEN UP IN ROOM TO USE
BATHROOM. HR REMAINS SINUS RHYTHM, RATE IN 60'S. BP LABILE, WILL OCCASIONALLY
HAVE HYPERTENSIVE BP WITH SBP 'S, HOWEVER IT IS NOT CONSISTENTLY
ELEVATED. O2 AT 4L NC REMAINS ON FOR PERIODS OF APNEA, SPO2 NEVER BELOW 92%,
RESPIRATIONS UNLABORED AND EVEN. AFEBRILE. HE DENIES NAUSEA/VOMITING OR
HEADACHES. REPORT GIVEN TO TABITHA JUAREZ AT 0400.

## 2022-03-24 NOTE — NUR
REASSESSMENT
 
PT SNORING, RESPIRATIONS UNLABORED AND EVEN. AROUSES TO VOICE. STATES HE IS
NOT ANXIOUS AT THIS TIME AND WOULD LIKE TO RETURN TO SLEEP. HR IS NSR IN 60'S,
BP STABLE. AFEBRILE. PRECEDEX CONTINUES AT 0.6MCG/KG/HR. WEARING 4L O2 NC,
SPO2 >96%.

## 2022-03-24 NOTE — NUR
SHIFT SUMMARY
  PATIENT CALM AND COOPERATIVE MOST OF THE DAY.  PRECEDEX DECREASED FROM 0.5
TO 0.3 MCG/ KG/ HOUR.  PATIENT RECEIVED PRN LIBRIUM AND ATIVAN THIS SHIFT AS
WELL.  SHORT TIME AGO PATIENT DECIDED THAT HE WANTED TO GO AMA.  MOTHER IN
ROOM.  NURSE HAD LENGTHY CONVERSATION WITH PATIENT AND THEN DR. REIS CAME
DOWN AND HAD LONG CONVERSATION WITH PATIENT AND MOTHER AS WELL.  PATIENT
STATES HE UNDERSTANDS RISKS OF LEAVING AND BENEFITS OF STAYING.  PATIENT
STATES "THE NEED FOR ALCOHOL IS JUST TOO GREAT.  I STILL WANT TO GO INPATIENT
TO GET HELP".  PATIENT INFORMED THAT HE WOULD HAVE TO FIND HIS OWN INPATIENT
AFTER LEAVING THE HOSPITAL AMA BECAUSE THE HOSPITAL NO LONGER WOULD TRY TO
FIND PLACEMENT FOR HIM.  PATIENT REMAINED AFEBRILE.  PATIENT DENIED PAIN THE
WHOLE SHIFT.  CIWA SCORE 6 TO 9.  PATIENT REMAINED ALERT AND ORIENTED.
PATIENT WORKED WITH OT AND OT STATED THEY FEEL COMFORTABLE WITH PATIENT
WALKING ON OWN BUT THAT PATIENT MAY NEED HELP WITH LINES AND CORDS.  PATIENT
REMAINED ON RA WHILE AWAKE AND 3 L NC WHILE SLEEPING.  PATIENT SB TO SR, HR
50S TO 60S.  SBP 120S TO 150S.  PATIENT HAD 2 BMS THIS SHIFT.  PATIENT HAD
GOOD APPETITE.   WNL.  NO CHANGES TO SKIN NOTED.  PATIENT REMAINED ADRIANNE.
PATIENT STARTED ON ZOLOFT AT NOON.  MOTHER WAS IN ROOM MOST OF THE SHIFT.
PATIENT SIGNED AMA DOCUMENTATION.  PATIENT TOOK ALL BELONGINGS INCLUDING CELL
PHONE.  PATIENT WALKED OUT TO MOTHER'S CARE AT ADMITTING ENTRANCE.  DISCHARGE
COMPLETE.

## 2022-03-24 NOTE — NUR
INITIAL ASSESSMENT
  PATIENT SLEEPING SOUNDLY UPON NURSE ENTERING ROOM.  PATIENT WAKES WITH
VERBAL STIMULI AND PATTING ON ARM.  PATIENT CALM AND COOPERATIVE.  PATIENT
SLOW TO RESPOND.  PATIENT ALERT AND ORIENTED EXCEPT TO MONTH.  SLIGHT TREMORS
NOTED.  PATIENT AFEBRILE.  PATIENT DENIES PAIN.  PATIENT 1 PERSON ASSIST TO
TOILET.  PATIENT IS WEAK AND STATES HE FEELS "GROGGY".  CIWA SCORE OF 6 THIS
AM.  LUNGS CLEAR IN UPPER LOBES AND DIMINISHED IN LOWER LOBES.  PATIENT ON RA
WHILE AWAKE AND ON 3 L NC WITH SLEEP TO KEEP SATS 90% AND GREATER.  PATIENT IN
SB, HR IN THE 50S.  SBP IN THE 130S.  ABDOMEN MODERATELY DISTENDED, SOFT, WITH
HYPERACTIVE BOWEL SOUNDS NOTED.  LAST BM ON 03/22.   WNL.  SKIN DIAPHORETIC
AND ADRIANNE.  R THUMB AMPUTATION NOTED.  PRECEDEX INFUSING AT 0.4 MCG/ KG/ HOUR.
 BED LOW, CALL LIGHT IN REACH.  BED ALARM ON.  MOTHER AT BEDSIDE.  WILL
CONTINUE TO MONITOR PATIENT FREQUENTLY THROUGHOUT SHIFT.

## 2022-03-24 NOTE — NUR
ASSUMED CARE/SHIFT SUMMARY. PT CARE TRANSFERRED AT APPROXIMATELY 0400. PT
SLEEPING IN BED, AROUSABLE AND FOLLOWING COMMANDS. ON 02 VIA NC AT 4 L/MIN. VS
STABLE. PRECEDEX RUNNING AT 0.5 MCG/KG/HR. SEE SHIFT ASSESSMENT FOR DETAILS.
WILL CONTINUE TO MONITOR AND REPORT OFF TO DAYSHIFT RN.

## 2022-03-25 ENCOUNTER — HOSPITAL ENCOUNTER (INPATIENT)
Dept: HOSPITAL 95 - ER | Age: 32
Discharge: LEFT BEFORE DISCHARGE | DRG: 894 | End: 2022-03-25
Attending: INTERNAL MEDICINE | Admitting: HOSPITALIST
Payer: COMMERCIAL

## 2022-03-25 VITALS — WEIGHT: 315 LBS | HEIGHT: 77 IN | BODY MASS INDEX: 37.19 KG/M2
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
## 2022-03-25 DIAGNOSIS — D72.829: ICD-10-CM

## 2022-03-25 DIAGNOSIS — R45.851: ICD-10-CM

## 2022-03-25 DIAGNOSIS — I10: ICD-10-CM

## 2022-03-25 DIAGNOSIS — F10.239: Primary | ICD-10-CM

## 2022-03-25 DIAGNOSIS — R45.1: ICD-10-CM

## 2022-03-25 DIAGNOSIS — Z71.6: ICD-10-CM

## 2022-03-25 DIAGNOSIS — Z79.899: ICD-10-CM

## 2022-03-25 DIAGNOSIS — E66.01: ICD-10-CM

## 2022-03-25 DIAGNOSIS — Z28.21: ICD-10-CM

## 2022-03-25 DIAGNOSIS — F17.210: ICD-10-CM

## 2022-03-25 DIAGNOSIS — F41.9: ICD-10-CM

## 2022-03-25 DIAGNOSIS — Z98.890: ICD-10-CM

## 2022-03-25 LAB
ALBUMIN SERPL BCP-MCNC: 3.8 G/DL (ref 3.4–5)
ALBUMIN/GLOB SERPL: 0.9 {RATIO} (ref 0.8–1.8)
ALT SERPL W P-5'-P-CCNC: 73 U/L (ref 12–78)
ANION GAP SERPL CALCULATED.4IONS-SCNC: 9 MMOL/L (ref 6–16)
AST SERPL W P-5'-P-CCNC: 48 U/L (ref 12–37)
BASOPHILS # BLD AUTO: 0.03 K/MM3 (ref 0–0.23)
BASOPHILS NFR BLD AUTO: 0 % (ref 0–2)
BILIRUB SERPL-MCNC: 0.4 MG/DL (ref 0.1–1)
BUN SERPL-MCNC: 17 MG/DL (ref 8–24)
CALCIUM SERPL-MCNC: 8.8 MG/DL (ref 8.5–10.1)
CHLORIDE SERPL-SCNC: 104 MMOL/L (ref 98–108)
CO2 SERPL-SCNC: 27 MMOL/L (ref 21–32)
CREAT SERPL-MCNC: 1.06 MG/DL (ref 0.6–1.2)
DEPRECATED RDW RBC AUTO: 38 FL (ref 35.1–46.3)
EOSINOPHIL # BLD AUTO: 0.01 K/MM3 (ref 0–0.68)
EOSINOPHIL NFR BLD AUTO: 0 % (ref 0–6)
ERYTHROCYTE [DISTWIDTH] IN BLOOD BY AUTOMATED COUNT: 12.5 % (ref 11.7–14.2)
ETHANOL SERPL-MCNC: 266 MG/DL
GLOBULIN SER CALC-MCNC: 4.1 G/DL (ref 2.2–4)
GLUCOSE SERPL-MCNC: 111 MG/DL (ref 70–99)
HCT VFR BLD AUTO: 43.6 % (ref 37–53)
HGB BLD-MCNC: 14.9 G/DL (ref 13.5–17.5)
IMM GRANULOCYTES # BLD AUTO: 0.07 K/MM3 (ref 0–0.1)
IMM GRANULOCYTES NFR BLD AUTO: 1 % (ref 0–1)
LYMPHOCYTES # BLD AUTO: 2.35 K/MM3 (ref 0.84–5.2)
LYMPHOCYTES NFR BLD AUTO: 19 % (ref 21–46)
MCHC RBC AUTO-ENTMCNC: 34.2 G/DL (ref 31.5–36.5)
MCV RBC AUTO: 83 FL (ref 80–100)
MONOCYTES # BLD AUTO: 0.64 K/MM3 (ref 0.16–1.47)
MONOCYTES NFR BLD AUTO: 5 % (ref 4–13)
NEUTROPHILS # BLD AUTO: 9.18 K/MM3 (ref 1.96–9.15)
NEUTROPHILS NFR BLD AUTO: 75 % (ref 41–73)
NRBC # BLD AUTO: 0 K/MM3 (ref 0–0.02)
NRBC BLD AUTO-RTO: 0 /100 WBC (ref 0–0.2)
PLATELET # BLD AUTO: 341 K/MM3 (ref 150–400)
POTASSIUM SERPL-SCNC: 4 MMOL/L (ref 3.5–5.5)
PROT SERPL-MCNC: 7.9 G/DL (ref 6.4–8.2)
SODIUM SERPL-SCNC: 140 MMOL/L (ref 136–145)
SP GR SPEC: 1.02 (ref 1–1.02)
URN SPEC COLLECT METH UR: (no result)
UROBILINOGEN UR STRIP-MCNC: (no result) MG/DL

## 2022-03-25 PROCEDURE — A9270 NON-COVERED ITEM OR SERVICE: HCPCS

## 2022-03-25 PROCEDURE — G0480 DRUG TEST DEF 1-7 CLASSES: HCPCS

## 2022-03-25 PROCEDURE — HZ2ZZZZ DETOXIFICATION SERVICES FOR SUBSTANCE ABUSE TREATMENT: ICD-10-PCS | Performed by: INTERNAL MEDICINE

## 2022-03-25 NOTE — NUR
SUMMARY OF EVENTS. PT ADMITTED TO ICU AT APPROXIMATELY 0405. MOTHER AT BEDSIDE
INITIALLY. PT VERY RESTLESS/AGITATED UPON ARRIVAL, EVENTUALLY COAXED INTO BED.
PRECEDEX STARTED AND TITRATED UP TO 0.5 MCG/KG/HR. LIBRIUM AND ATIVAN GIVEN.
DURING FIRST HOUR AND A HALF, PT WAS REPEATEDLY REDIRECTED AND TALKED INTO
STAYING IN BED, PT ATTEMPTED TO PULL LINES/CORDS OFF MULTIPLE TIMES. ONCE
SEDATED, PT RESTED QUIETLY FOR REMAINDER OF SHIFT. PRECEDEX TITRATED BACK DOWN
FOR SYSTOLIC BP BELOW 100, CURRENTLY ON STANDBY. VS STABLE, SEE SHIFT
ASSESSMENT FOR FURTHER DETAILS. 1:1 SITTER IN PLACE FOR HIGH RISK SI. WILL
CONTINUE TO MONITOR AND REPORT OFF TO DAYSHIFT RN.

## 2022-03-25 NOTE — NUR
PT INCREASINGLY AGITATED DESPITE INCREASE IN PRECEDEX RATE TO 1.4 MCG/KG/HR .
PT REFUSING ATIVAN OR LIBRIUM AND IS INSISTING THAT HE IS LEAVING AGAINST
MEDICAL ADVICE.  SECURITY CALLED TO STANDBY OUTSIDE OF ROOM. CHARGE NURSE AT
BEDSIDE ATTEMPTING TO CALM PATIENT AND ENCOURAGE PT TO REST AND TAKE
MEDICATIONS AS ORDERED. PT INSISTING TO SEE DR. MILIAN, DR. MILIAN
CONTACTED AND UNABLE TO ASSESS PT UNTIL THIS EVENING. PT CONTINUES TO
ESCALATE, PULLS ALL WIRES/CORDS OFF AND STANDS AT SIDE OF BED. PT THREATENING
TO KILL STAFF AND PHYSICAL ASSAULT THEM.  DESPITE MULTIPLE ATTEMPTS AT CALMING
PTS AND CONTINUED PHYSICALLY AGGRESSIVE BEHAVIOR IT WAS DECIDED TO PLACE PT IN
4 PT TOUGH CUFFS. PT CONTINUED ATTEMPTS AT PHYSICAL AND VERBAL ASSAULTS. DR. PEARL AT BEDSIDE, NEW MEDICATION ORDERS RECEIVED. CURRENTLY PT RESTING
COMFORTABLY, PT NO LONGER PULLING AT LINES/CORDS BUT IS EASILY AGITATED IF
DISTURBED. PT'S MOTHER UPDATED WITH PTS BEHAVIOR AND PLAN OF CARE. DISCUSSED
PT'S VOCALIZATION OF SI WITH GUN. MOTHER STATES THAT PT DOES HAVE "2 GUNS" AND
THAT THERE ARE SEVERAL GUNS IN THE HOUSE. ENCOURAGED MOTHER TO LOCK GUNS UP IN
GUN SAFE IF POSSIBLE TO ELIMINATE ACCESS TO FIREARMS, MOTHER STATES SHE IS
GOING TO "TAKE GUNS TO OTHER SON'S HOUSE".  PT DENIES SI T/O THIS SHIFT.
STATES THAT HE "DOESN'T WANT TO GO TO REHAB" AND THAT HE WANTS TO "GO TO
[place]".

## 2022-03-25 NOTE — NUR
ASSUMED CARE OF PT, REPORT RCV'D FROM TABITHA JUAREZ.
 
PT ALERT DROWSY BUT AWAKES TO VERBAL STIMULATION.  PT DENIES THAT HE HAS
SUICIDAL IDEATION AND STATES THAT HE "NEW IT WAS A MISTAKE AS SOON AS HE LEFT
AMA YESTERDAY. PT REFUSING ATIVAN OR LIBRIUM AT THIS TIME. DISCUSSED THE
BENEFIT OF ALLEVIATING WITHDRAWAL SYMPTOMS. PT CONTINUES TO DENY MEDICATION
AND SAYS HE IS GOING TO "ROUGH THIS ONE OUT". PRECEDEX GTT RESTARTED AT 0.5
MCG/KG/HR. PT STANDS AT BEDSIDE WITH STANDBY ASSIST TO USE URINAL. PT MOSTLY
STEADY ON HIS FEET, MINIMAL TREMORS NOTED.  AUDIBLE INSPIRATORY/EXPIRATORY
WHEEZES HEARD, PT ON 5L NC WITH SATS>90%.  2-MD HOLD INITIATED. ROOM
MITIGATION PERFORMED PER PROTOCOL. 1:1 SITTER AT DOORWAY WITH UNOBSTRUCTED
VIEW. SEE FULL SHIFT ASSESSMENT.

## 2022-03-25 NOTE — NUR
PRECEDEX PLACED ON STANDBY SO DR. MILIAN ABLE TO ASSESS PT. PT DENIES SI,
2-MD HOLD LIFTED. PT COOPERATIVE, RESTRAINTS REMOVED. PT EXPRESSES WISH TO
LEAVE AMA. DISCUSSED RISKS ASSOCIATED WITH LEAVING AGAINST MEDICAL ADVICE.
CHARGE NURSE AND DR. REIS AT BEDSIDE ENCOURAGING PT TO STAY. PT REFUSES.
FIGUEROA CATHETER AND IV'S REMOVED. AMA PAPERWORK SIGNED. PT'S MOTHER CALLED,
WILL BRING PT CLOTHES AND TRANSPORT PT HOME. PT DENIES NEEDS AT THIS TIME.

## 2022-03-28 ENCOUNTER — HOSPITAL ENCOUNTER (EMERGENCY)
Dept: HOSPITAL 95 - ER | Age: 32
Discharge: HOME | End: 2022-03-28
Payer: COMMERCIAL

## 2022-03-28 VITALS — WEIGHT: 315 LBS | HEIGHT: 76 IN | BODY MASS INDEX: 38.36 KG/M2

## 2022-03-28 DIAGNOSIS — F17.210: ICD-10-CM

## 2022-03-28 DIAGNOSIS — Z79.899: ICD-10-CM

## 2022-03-28 DIAGNOSIS — F10.10: Primary | ICD-10-CM

## 2022-03-28 LAB
ALBUMIN SERPL BCP-MCNC: 3.7 G/DL (ref 3.4–5)
ALBUMIN/GLOB SERPL: 0.9 {RATIO} (ref 0.8–1.8)
ALT SERPL W P-5'-P-CCNC: 94 U/L (ref 12–78)
ANION GAP SERPL CALCULATED.4IONS-SCNC: 6 MMOL/L (ref 6–16)
APAP SERPL-MCNC: <2 UG/ML (ref 10–30)
AST SERPL W P-5'-P-CCNC: 83 U/L (ref 12–37)
BASOPHILS # BLD AUTO: 0.04 K/MM3 (ref 0–0.23)
BASOPHILS NFR BLD AUTO: 0 % (ref 0–2)
BENZODIAZ UR-MCNC: DETECTED UG/L
BILIRUB SERPL-MCNC: 0.4 MG/DL (ref 0.1–1)
BUN SERPL-MCNC: 5 MG/DL (ref 8–24)
CALCIUM SERPL-MCNC: 8.9 MG/DL (ref 8.5–10.1)
CHLORIDE SERPL-SCNC: 105 MMOL/L (ref 98–108)
CO2 SERPL-SCNC: 29 MMOL/L (ref 21–32)
CREAT SERPL-MCNC: 1.08 MG/DL (ref 0.6–1.2)
DEPRECATED RDW RBC AUTO: 41.1 FL (ref 35.1–46.3)
EOSINOPHIL # BLD AUTO: 0.02 K/MM3 (ref 0–0.68)
EOSINOPHIL NFR BLD AUTO: 0 % (ref 0–6)
ERYTHROCYTE [DISTWIDTH] IN BLOOD BY AUTOMATED COUNT: 13.2 % (ref 11.7–14.2)
ETHANOL SERPL-MCNC: 293 MG/DL
GLOBULIN SER CALC-MCNC: 3.9 G/DL (ref 2.2–4)
GLUCOSE SERPL-MCNC: 107 MG/DL (ref 70–99)
HCT VFR BLD AUTO: 43.1 % (ref 37–53)
HGB BLD-MCNC: 14.4 G/DL (ref 13.5–17.5)
IMM GRANULOCYTES # BLD AUTO: 0.06 K/MM3 (ref 0–0.1)
IMM GRANULOCYTES NFR BLD AUTO: 1 % (ref 0–1)
LYMPHOCYTES # BLD AUTO: 3.1 K/MM3 (ref 0.84–5.2)
LYMPHOCYTES NFR BLD AUTO: 32 % (ref 21–46)
MCHC RBC AUTO-ENTMCNC: 33.4 G/DL (ref 31.5–36.5)
MCV RBC AUTO: 84 FL (ref 80–100)
MONOCYTES # BLD AUTO: 0.56 K/MM3 (ref 0.16–1.47)
MONOCYTES NFR BLD AUTO: 6 % (ref 4–13)
NEUTROPHILS # BLD AUTO: 5.93 K/MM3 (ref 1.96–9.15)
NEUTROPHILS NFR BLD AUTO: 61 % (ref 41–73)
NRBC # BLD AUTO: 0 K/MM3 (ref 0–0.02)
NRBC BLD AUTO-RTO: 0 /100 WBC (ref 0–0.2)
PLATELET # BLD AUTO: 289 K/MM3 (ref 150–400)
POTASSIUM SERPL-SCNC: 3.7 MMOL/L (ref 3.5–5.5)
PROT SERPL-MCNC: 7.6 G/DL (ref 6.4–8.2)
PROTHROMBIN TIME: 10 SEC (ref 9.7–11.5)
SALICYLATES SERPL-MCNC: 1.9 MG/DL (ref 2.8–20)
SODIUM SERPL-SCNC: 140 MMOL/L (ref 136–145)
SP GR SPEC: 1 (ref 1–1.02)
UROBILINOGEN UR STRIP-MCNC: (no result) MG/DL

## 2022-03-28 PROCEDURE — G0480 DRUG TEST DEF 1-7 CLASSES: HCPCS

## 2022-04-05 ENCOUNTER — HOSPITAL ENCOUNTER (OUTPATIENT)
Dept: HOSPITAL 95 - ER | Age: 32
Setting detail: OBSERVATION
Discharge: HOME | End: 2022-04-05
Attending: EMERGENCY MEDICINE | Admitting: EMERGENCY MEDICINE
Payer: COMMERCIAL

## 2022-04-05 VITALS — HEIGHT: 76 IN | WEIGHT: 315 LBS | BODY MASS INDEX: 38.36 KG/M2

## 2022-04-05 DIAGNOSIS — F10.24: Primary | ICD-10-CM

## 2022-04-05 DIAGNOSIS — I10: ICD-10-CM

## 2022-04-05 DIAGNOSIS — Z20.822: ICD-10-CM

## 2022-04-05 DIAGNOSIS — F10.239: ICD-10-CM

## 2022-04-05 DIAGNOSIS — F17.210: ICD-10-CM

## 2022-04-05 DIAGNOSIS — F32.A: ICD-10-CM

## 2022-04-05 LAB
ALBUMIN SERPL BCP-MCNC: 3.7 G/DL (ref 3.4–5)
ALBUMIN/GLOB SERPL: 0.9 {RATIO} (ref 0.8–1.8)
ALT SERPL W P-5'-P-CCNC: 142 U/L (ref 12–78)
ANION GAP SERPL CALCULATED.4IONS-SCNC: 7 MMOL/L (ref 6–16)
APAP SERPL-MCNC: <2 UG/ML (ref 10–30)
AST SERPL W P-5'-P-CCNC: 129 U/L (ref 12–37)
BASOPHILS # BLD AUTO: 0.02 K/MM3 (ref 0–0.23)
BASOPHILS NFR BLD AUTO: 0 % (ref 0–2)
BILIRUB SERPL-MCNC: 0.4 MG/DL (ref 0.1–1)
BUN SERPL-MCNC: 5 MG/DL (ref 8–24)
CALCIUM SERPL-MCNC: 8.8 MG/DL (ref 8.5–10.1)
CHLORIDE SERPL-SCNC: 110 MMOL/L (ref 98–108)
CO2 SERPL-SCNC: 26 MMOL/L (ref 21–32)
CREAT SERPL-MCNC: 1.07 MG/DL (ref 0.6–1.2)
DEPRECATED RDW RBC AUTO: 45.9 FL (ref 35.1–46.3)
EOSINOPHIL # BLD AUTO: 0.04 K/MM3 (ref 0–0.68)
EOSINOPHIL NFR BLD AUTO: 1 % (ref 0–6)
ERYTHROCYTE [DISTWIDTH] IN BLOOD BY AUTOMATED COUNT: 15.5 % (ref 11.7–14.2)
ETHANOL SERPL-MCNC: 235 MG/DL
FLUAV RNA SPEC QL NAA+PROBE: NEGATIVE
FLUBV RNA SPEC QL NAA+PROBE: NEGATIVE
GLOBULIN SER CALC-MCNC: 4 G/DL (ref 2.2–4)
GLUCOSE SERPL-MCNC: 110 MG/DL (ref 70–99)
HCT VFR BLD AUTO: 45.4 % (ref 37–53)
HGB BLD-MCNC: 15.1 G/DL (ref 13.5–17.5)
IMM GRANULOCYTES # BLD AUTO: 0.03 K/MM3 (ref 0–0.1)
IMM GRANULOCYTES NFR BLD AUTO: 0 % (ref 0–1)
LYMPHOCYTES # BLD AUTO: 2.61 K/MM3 (ref 0.84–5.2)
LYMPHOCYTES NFR BLD AUTO: 30 % (ref 21–46)
MCHC RBC AUTO-ENTMCNC: 33.3 G/DL (ref 31.5–36.5)
MCV RBC AUTO: 86 FL (ref 80–100)
MONOCYTES # BLD AUTO: 0.68 K/MM3 (ref 0.16–1.47)
MONOCYTES NFR BLD AUTO: 8 % (ref 4–13)
NEUTROPHILS # BLD AUTO: 5.4 K/MM3 (ref 1.96–9.15)
NEUTROPHILS NFR BLD AUTO: 62 % (ref 41–73)
NRBC # BLD AUTO: 0 K/MM3 (ref 0–0.02)
NRBC BLD AUTO-RTO: 0 /100 WBC (ref 0–0.2)
PLATELET # BLD AUTO: 300 K/MM3 (ref 150–400)
POTASSIUM SERPL-SCNC: 3.9 MMOL/L (ref 3.5–5.5)
PROT SERPL-MCNC: 7.7 G/DL (ref 6.4–8.2)
RSV RNA SPEC QL NAA+PROBE: NEGATIVE
SALICYLATES SERPL-MCNC: 2.2 MG/DL (ref 2.8–20)
SARS-COV-2 RNA RESP QL NAA+PROBE: NEGATIVE
SODIUM SERPL-SCNC: 143 MMOL/L (ref 136–145)

## 2022-04-05 PROCEDURE — G0480 DRUG TEST DEF 1-7 CLASSES: HCPCS

## 2022-04-05 PROCEDURE — A9270 NON-COVERED ITEM OR SERVICE: HCPCS

## 2022-06-01 ENCOUNTER — HOSPITAL ENCOUNTER (EMERGENCY)
Dept: HOSPITAL 95 - ER | Age: 32
Discharge: HOME | End: 2022-06-01
Payer: COMMERCIAL

## 2022-06-01 VITALS — HEIGHT: 76 IN | WEIGHT: 315 LBS | BODY MASS INDEX: 38.36 KG/M2

## 2022-06-01 DIAGNOSIS — F10.129: Primary | ICD-10-CM

## 2022-06-01 DIAGNOSIS — I10: ICD-10-CM

## 2022-06-01 DIAGNOSIS — F17.210: ICD-10-CM

## 2022-06-01 DIAGNOSIS — Z79.899: ICD-10-CM

## 2022-06-01 LAB
ALBUMIN SERPL BCP-MCNC: 3.8 G/DL (ref 3.4–5)
ALBUMIN/GLOB SERPL: 1 {RATIO} (ref 0.8–1.8)
ALT SERPL W P-5'-P-CCNC: 58 U/L (ref 12–78)
ANION GAP SERPL CALCULATED.4IONS-SCNC: 6 MMOL/L (ref 6–16)
APAP SERPL-MCNC: <2 UG/ML (ref 10–30)
AST SERPL W P-5'-P-CCNC: 37 U/L (ref 12–37)
BASOPHILS # BLD AUTO: 0.03 K/MM3 (ref 0–0.23)
BASOPHILS NFR BLD AUTO: 1 % (ref 0–2)
BILIRUB SERPL-MCNC: 0.5 MG/DL (ref 0.1–1)
BUN SERPL-MCNC: 10 MG/DL (ref 8–24)
CALCIUM SERPL-MCNC: 8.3 MG/DL (ref 8.5–10.1)
CHLORIDE SERPL-SCNC: 105 MMOL/L (ref 98–108)
CO2 SERPL-SCNC: 30 MMOL/L (ref 21–32)
CREAT SERPL-MCNC: 0.88 MG/DL (ref 0.6–1.2)
DEPRECATED RDW RBC AUTO: 40.9 FL (ref 35.1–46.3)
EOSINOPHIL # BLD AUTO: 0.01 K/MM3 (ref 0–0.68)
EOSINOPHIL NFR BLD AUTO: 0 % (ref 0–6)
ERYTHROCYTE [DISTWIDTH] IN BLOOD BY AUTOMATED COUNT: 13.2 % (ref 11.7–14.2)
ETHANOL SERPL-MCNC: 279 MG/DL
GLOBULIN SER CALC-MCNC: 3.7 G/DL (ref 2.2–4)
GLUCOSE SERPL-MCNC: 112 MG/DL (ref 70–99)
HCT VFR BLD AUTO: 43.8 % (ref 37–53)
HGB BLD-MCNC: 14.9 G/DL (ref 13.5–17.5)
IMM GRANULOCYTES # BLD AUTO: 0.04 K/MM3 (ref 0–0.1)
IMM GRANULOCYTES NFR BLD AUTO: 1 % (ref 0–1)
LYMPHOCYTES # BLD AUTO: 1.9 K/MM3 (ref 0.84–5.2)
LYMPHOCYTES NFR BLD AUTO: 29 % (ref 21–46)
MCHC RBC AUTO-ENTMCNC: 34 G/DL (ref 31.5–36.5)
MCV RBC AUTO: 84 FL (ref 80–100)
MONOCYTES # BLD AUTO: 0.35 K/MM3 (ref 0.16–1.47)
MONOCYTES NFR BLD AUTO: 5 % (ref 4–13)
NEUTROPHILS # BLD AUTO: 4.21 K/MM3 (ref 1.96–9.15)
NEUTROPHILS NFR BLD AUTO: 64 % (ref 41–73)
NRBC # BLD AUTO: 0 K/MM3 (ref 0–0.02)
NRBC BLD AUTO-RTO: 0 /100 WBC (ref 0–0.2)
PLATELET # BLD AUTO: 315 K/MM3 (ref 150–400)
POTASSIUM SERPL-SCNC: 4.1 MMOL/L (ref 3.5–5.5)
PROT SERPL-MCNC: 7.5 G/DL (ref 6.4–8.2)
SALICYLATES SERPL-MCNC: 1.9 MG/DL (ref 2.8–20)
SODIUM SERPL-SCNC: 141 MMOL/L (ref 136–145)
TSH SERPL DL<=0.005 MIU/L-ACNC: 1.07 UIU/ML (ref 0.36–4.8)

## 2022-06-01 PROCEDURE — G0480 DRUG TEST DEF 1-7 CLASSES: HCPCS

## 2022-06-02 ENCOUNTER — HOSPITAL ENCOUNTER (EMERGENCY)
Dept: HOSPITAL 95 - ER | Age: 32
Discharge: LEFT BEFORE DISCHARGE | End: 2022-06-02
Payer: COMMERCIAL

## 2022-06-02 VITALS — BODY MASS INDEX: 38.36 KG/M2 | WEIGHT: 315 LBS | HEIGHT: 76 IN

## 2022-06-02 DIAGNOSIS — Z79.899: ICD-10-CM
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
## 2022-06-02 DIAGNOSIS — I10: ICD-10-CM

## 2022-06-02 DIAGNOSIS — F10.229: Primary | ICD-10-CM

## 2022-06-02 LAB
ALBUMIN SERPL BCP-MCNC: 3.9 G/DL (ref 3.4–5)
ALBUMIN/GLOB SERPL: 1.1 {RATIO} (ref 0.8–1.8)
ALT SERPL W P-5'-P-CCNC: 95 U/L (ref 12–78)
ANION GAP SERPL CALCULATED.4IONS-SCNC: 7 MMOL/L (ref 6–16)
AST SERPL W P-5'-P-CCNC: 72 U/L (ref 12–37)
BASOPHILS # BLD AUTO: 0.03 K/MM3 (ref 0–0.23)
BASOPHILS NFR BLD AUTO: 0 % (ref 0–2)
BILIRUB SERPL-MCNC: 0.5 MG/DL (ref 0.1–1)
BUN SERPL-MCNC: 11 MG/DL (ref 8–24)
CALCIUM SERPL-MCNC: 8.4 MG/DL (ref 8.5–10.1)
CHLORIDE SERPL-SCNC: 105 MMOL/L (ref 98–108)
CO2 SERPL-SCNC: 28 MMOL/L (ref 21–32)
CREAT SERPL-MCNC: 1.01 MG/DL (ref 0.6–1.2)
DEPRECATED RDW RBC AUTO: 40.6 FL (ref 35.1–46.3)
EOSINOPHIL # BLD AUTO: 0.01 K/MM3 (ref 0–0.68)
EOSINOPHIL NFR BLD AUTO: 0 % (ref 0–6)
ERYTHROCYTE [DISTWIDTH] IN BLOOD BY AUTOMATED COUNT: 13.2 % (ref 11.7–14.2)
ETHANOL SERPL-MCNC: 324 MG/DL
GLOBULIN SER CALC-MCNC: 3.6 G/DL (ref 2.2–4)
GLUCOSE SERPL-MCNC: 106 MG/DL (ref 70–99)
HCT VFR BLD AUTO: 43.9 % (ref 37–53)
HGB BLD-MCNC: 15.2 G/DL (ref 13.5–17.5)
IMM GRANULOCYTES # BLD AUTO: 0.07 K/MM3 (ref 0–0.1)
IMM GRANULOCYTES NFR BLD AUTO: 1 % (ref 0–1)
LYMPHOCYTES # BLD AUTO: 2.53 K/MM3 (ref 0.84–5.2)
LYMPHOCYTES NFR BLD AUTO: 34 % (ref 21–46)
MCHC RBC AUTO-ENTMCNC: 34.6 G/DL (ref 31.5–36.5)
MCV RBC AUTO: 84 FL (ref 80–100)
MONOCYTES # BLD AUTO: 0.48 K/MM3 (ref 0.16–1.47)
MONOCYTES NFR BLD AUTO: 7 % (ref 4–13)
NEUTROPHILS # BLD AUTO: 4.32 K/MM3 (ref 1.96–9.15)
NEUTROPHILS NFR BLD AUTO: 58 % (ref 41–73)
NRBC # BLD AUTO: 0 K/MM3 (ref 0–0.02)
NRBC BLD AUTO-RTO: 0 /100 WBC (ref 0–0.2)
PLATELET # BLD AUTO: 359 K/MM3 (ref 150–400)
POTASSIUM SERPL-SCNC: 3.8 MMOL/L (ref 3.5–5.5)
PROT SERPL-MCNC: 7.5 G/DL (ref 6.4–8.2)
SODIUM SERPL-SCNC: 140 MMOL/L (ref 136–145)

## 2022-06-02 PROCEDURE — G0480 DRUG TEST DEF 1-7 CLASSES: HCPCS

## 2023-12-20 NOTE — NUR
PATIENT SHOWER, STEADY GAIT AMBULATING FROM SHOWER TO ROOM, STAND BY ASSIST
ONLY Plan of care signed by Dr. Holguin and faxed to [facility] at [phone].   Notes sent to scan

## 2024-03-11 NOTE — NUR
ASSUMED CARE
 
RECEIVED REPORT FROM ER NURSE. PT CAME TO ICU VIA STRETCHER AT 0048. IN ER HE
WAS VERY AGITATED AND COMBATIVE, BUT FOLLOWING SOME SEDATION WITH KETAMINE, HE
HAS BEEN SLEEPING. HE IS SNORING HEAVILY AND IT IS APPARENT THAT HE HAS KANDICE,
BUT HE IS MAINTAING HIS AIRWAY AND O2 SATS. HE WILL OCCASIONALLY DROP TO THE
HIGH 80s BUT HE DOESN'T STAY THERE TOO LONG. HE IS ON 8L OF THE OXYMIZER. HIS
BLOOD PRESSURE IS IMPROVING, AFTER BEING QUITE HYPERTENSIVE. HE IS IN SINUS
RHYTHM WITH A RATE IN THE  RANGE. HE IS QUITE SUNBURNT, AND HIS CHEST,
SHOULDERS, AND FACE APPEAR FLUSHED - BUT IT COULD JUST BE HIS SUNBURN.
 
HIS S.O. TOLD ME HE HAS GOTTEN LIKE THIS BEFORE WHEN HE IS HEAVILY
INTOXICATED. HE HAS BEEN STRUGGLING WITH ETOH FOR SOME TIME NOW - TRYING TO
QUIT, BUT RELAPSING. HE EVEN WAS HOSPITALIZED HERE FOR THE SAME REASON. HE IS
CURRENTLY NOT ON A 2MD HOLD, BUT HE IS A HIGH RISK FOR SI, AND HIS ROOM WAS
MITIGATED. HE HAS A SITTER WATCHING HIM NOW. A BANANA BAG IS INFUSING NOW AT
75 ML/HR.
DISCHARGE
PT ALERT AND ORIENTED THROUGHOUT SHIFT. VS STABLE. O2 SATS HAVE REMAINED ABOVE
90% ON RA SINCE APPROXIMATELY 0900. PT DENIES ANY PAIN. PT MEDICATED WITH
LIBRIUM PER PRAKASH. DR. GAVIN IN TO SEE PT THIS AFTERNOON AND OK WITH
DISCHARGE. PT PROVIDED WITH DISCHARGE INSTRUCTIONS. PT EDUCATED ON NEW
MEDICATIONS. ALL QUESTIONS ANSWERED. PT TAKEN OUT BY WC.
SHIFT SUMMARY
 
NO ACUTE EVENTS OVERNIGHT. PT SLEPT THROUGHOUT THE NIGHT. PT RESPONDING TO
VERBAL STIMULI, WITHOUT ANY NOXIOUS STIMULI. BEFORE, ONE WOULD HAVE TO "NUDGE"
HIM A BIT TO GET A RESPONSE, NOW THE SEDATION LEVEL APPEARS TO BE LIGHTER, AND
HE IS MOVING AROUND MORE FREQUENTLY - YET REMAINS ASLEEP. HE CONTINUES TO USE
8L ON THE OXYMIZER, WHEN NOT OBSTRUCTING HE SATs HIGH 90's. THE BANANA BAG
CONTINUES TO INFUSE AT 75ML/HR. VITALS STABLE, SINUS RHYTHM. BED LOW AND
LOCKED. CALL LIGHT WITHIN REACH. MARTIN HOFFMANN ACTING AS SITTER.
Safety Plan complete.  Dericksiri present in room.  Dericksiri called for help to get
pt to the hospital.  Nasir reports "he cannot sit still". Patient had
relapsed after 32 days of sobriety.  he was unable to identify what stressors
may have been present.  He does own a 45 gun.  Discussed with he and nasir to
lock it up so he does not have access "in case he drank again".  Reviewed poor
impulse control and risk when intoxicated.  Crisis number magnet and nasir
given "after an attempt" education booklet.  Pt completed [facility]
in past.  He denies current SI or previous attempts.  Discussed AA and
referral to [facility] for him.  RN apprised and will contact Care Manager for
DC plan.  Owen has panic attacks since age 17, and he began drinking then
also to try and control them.  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  He smokes pot to calm down at
home.
He has been on Naltrexone before, but it made him ill when he took it.
Discussed Anatabuse, and suggested he speak to psychiatrist seeing him in
afternoon.  Interview completed at 1230.
intact